# Patient Record
Sex: MALE | Race: WHITE | NOT HISPANIC OR LATINO | URBAN - METROPOLITAN AREA
[De-identification: names, ages, dates, MRNs, and addresses within clinical notes are randomized per-mention and may not be internally consistent; named-entity substitution may affect disease eponyms.]

---

## 2017-09-18 ENCOUNTER — IMPORTED ENCOUNTER (OUTPATIENT)
Dept: URBAN - METROPOLITAN AREA CLINIC 50 | Facility: CLINIC | Age: 72
End: 2017-09-18

## 2017-09-18 NOTE — PATIENT DISCUSSION
"""Follow OD ERM w/o surgery. Call if vision decreases or distortion increases. Recommend regular Amsler checks.  """

## 2017-09-22 ENCOUNTER — IMPORTED ENCOUNTER (OUTPATIENT)
Dept: URBAN - METROPOLITAN AREA CLINIC 50 | Facility: CLINIC | Age: 72
End: 2017-09-22

## 2018-02-20 ENCOUNTER — IMPORTED ENCOUNTER (OUTPATIENT)
Dept: URBAN - METROPOLITAN AREA CLINIC 50 | Facility: CLINIC | Age: 73
End: 2018-02-20

## 2021-04-17 ASSESSMENT — VISUAL ACUITY
OS_OTHER: 20/80. 20/400.
OS_CC: J1
OD_CC: 20/30+-
OS_CC: 20/30
OS_CC: 20/25-
OD_BAT: 20/80
OD_CC: J1
OS_BAT: 20/80
OD_CC: 20/30
OD_OTHER: 20/80. 20/400.

## 2021-04-17 ASSESSMENT — TONOMETRY
OD_IOP_MMHG: 14
OS_IOP_MMHG: 14